# Patient Record
Sex: FEMALE | Race: WHITE | NOT HISPANIC OR LATINO | Employment: UNEMPLOYED | URBAN - NONMETROPOLITAN AREA
[De-identification: names, ages, dates, MRNs, and addresses within clinical notes are randomized per-mention and may not be internally consistent; named-entity substitution may affect disease eponyms.]

---

## 2020-04-04 ENCOUNTER — OFFICE VISIT CONVERTED (OUTPATIENT)
Dept: FAMILY MEDICINE CLINIC | Age: 25
End: 2020-04-04
Attending: NURSE PRACTITIONER

## 2021-05-18 NOTE — PROGRESS NOTES
Amie Haddad  1995     Office/Outpatient Visit    Visit Date: Sat, Apr 4, 2020 11:01 am    Provider: Genevieve Carlson N.P. (Assistant: Therese Ervin LPN)    Location: [facility]        Electronically signed by Genevieve Carlson N.P. on  04/04/2020 11:52:38 AM                             Subjective:        CC: Ms. Haddad is a 24 year old White female.  This is her first visit to the clinic.  She presents with cold symptoms.          HPI: 24 year old presenting for TELEHEALTH visit c/o sore throat, subjective fever, weak, luly ear pain x 3 days. She has tried mucinex and otc allergy medication. Someone at work, [employer] tested negative for strep, flu and mono. She was tested for corona virus yesterday.    ROS:     CONSTITUTIONAL:  Positive for fatigue and fever.   Negative for chills.      EYES:  Negative for blurred vision and eye drainage.      E/N/T:  Positive for ear pain ( bilateral ), nasal congestion, frequent rhinorrhea and sore throat.   Negative for diminished hearing or sinus pressure.      CARDIOVASCULAR:  Negative for chest pain, palpitations and pedal edema.      RESPIRATORY:  Negative for recent cough and dyspnea.      GASTROINTESTINAL:  Negative for abdominal pain, diarrhea, nausea and vomiting.      MUSCULOSKELETAL:  Negative for myalgias.      INTEGUMENTARY/BREAST:  Negative for rash.      NEUROLOGICAL:  Negative for dizziness, paresthesias and weakness.          Past Medical History / Family History / Social History:         Last Reviewed on 4/04/2020 11:44 AM by Genevieve Carlson    Past Medical History:         benign heart murmur dg at 2 or 3         GYNECOLOGICAL HISTORY:    No problems with menstrual cycles.    Menarche occurred at age 14. SEXUALLY ACTIVE YES         Surgical History:     Other Surgeries    wisdom teeth removed;         Family History:     Father: Healthy     Mother: Hypertension;  gest. diabetes     Sister(s): Healthy; 2 sister(s) total      Paternal Grandfather: Hypertension;  Dementia     Paternal Grandmother: Hypertension;  Cerebrovascular Accident;  Type 2 Diabetes     Maternal Grandfather:  at age 64; Cause of death was MI     Maternal Grandmother:  at age 65; Cause of death was CHF;  Type 2 Diabetes         Social History:     Occupation: [employer]     Marital Status: Single     Children: 1 child         Tobacco/Alcohol/Supplements:     Last Reviewed on 2020 11:44 AM by Genevieve Carlson    Tobacco: She has never smoked.  Non-drinker         Substance Abuse History:     Last Reviewed on 2020 11:44 AM by Genevieve Carlson    None         Mental Health History:     Last Reviewed on 2020 11:44 AM by Genevieve Carlson    NEGATIVE         Immunizations:     None        Allergies:     Last Reviewed on 2020 11:44 AM by Genevieve Carlson    No Known Allergies.        Current Medications:     Last Reviewed on 2020 11:44 AM by Genevieve Carlson    etonogestreL 68 mg Subdermal Implant [in L arm]    diphenhydrAMINE HCl 25 mg oral capsule [take 1 capsule (25 mg) by oral route daily]        Assessment:         J06.9   Acute upper respiratory infection, unspecified           ORDERS:         Meds Prescribed:       [New Rx] Augmentin 875-125 mg oral tablet [take 1 tablet by oral route every 12 hours], #20 (twenty) tablets, Refills: 0 (zero)       [New Rx] fluticasone propionate 50 mcg/actuation Intranasal Spray, Suspension [inhale 1 spray (50 mcg) in each nostril by intranasal route daily], #16 (sixteen) milliliters, Refills: 0 (zero)                 Plan:         Acute upper respiratory infection, unspecifiedIncrease fluid intake and rest. Complete rx abx. Continue to take daily allergy medication. If coworker's test is positive, stay home for minimum of 1 week and then 72 hours symptoms free. If negative, stay home until 72 hrs symptom free. Pt is going to find number to fax note too and call back to office. Pt v/u and had no  further questions.     Telehealth: Verbal consent obtained for visit to occur via phone call; ; Total time spent was 8 minutes; [id number]--Yynvvovsl E/M 5-10 minutes           Prescriptions:       [New Rx] Augmentin 875-125 mg oral tablet [take 1 tablet by oral route every 12 hours], #20 (twenty) tablets, Refills: 0 (zero)       [New Rx] fluticasone propionate 50 mcg/actuation Intranasal Spray, Suspension [inhale 1 spray (50 mcg) in each nostril by intranasal route daily], #16 (sixteen) milliliters, Refills: 0 (zero)             Charge Capture:         Primary Diagnosis:     J06.9  Acute upper respiratory infection, unspecified           Orders:      63114  Phys/QHP telephone evaluation 5-10 min  (In-House)

## 2022-10-24 ENCOUNTER — OFFICE VISIT (OUTPATIENT)
Dept: FAMILY MEDICINE CLINIC | Age: 27
End: 2022-10-24

## 2022-10-24 VITALS
DIASTOLIC BLOOD PRESSURE: 71 MMHG | HEIGHT: 63 IN | TEMPERATURE: 98.9 F | BODY MASS INDEX: 31.4 KG/M2 | SYSTOLIC BLOOD PRESSURE: 114 MMHG | HEART RATE: 99 BPM | WEIGHT: 177.2 LBS

## 2022-10-24 DIAGNOSIS — B07.0 PLANTAR WART OF RIGHT FOOT: Primary | ICD-10-CM

## 2022-10-24 PROCEDURE — 99213 OFFICE O/P EST LOW 20 MIN: CPT | Performed by: NURSE PRACTITIONER

## 2022-10-24 NOTE — PROGRESS NOTES
"Chief Complaint  Toe Pain (\"Corn\" on heels, very painful. Wanting a referral to the podiatrist. )    Subjective          Amie Haddad presents to [facility] for painful corns on her right heel.  She is wanting a referral to a podiatrist.  She has been using the corn patches that she can buy over-the-counter.  No other acute concerns or issues at this time.      Objective   Vital Signs:   Vitals:    10/24/22 1045   BP: 114/71   BP Location: Left arm   Patient Position: Sitting   Cuff Size: Adult   Pulse: 99   Temp: 98.9 °F (37.2 °C)   TempSrc: Oral   Weight: 80.4 kg (177 lb 3.2 oz)   Height: 160 cm (63\")       Physical Exam  Vitals reviewed.   Constitutional:       General: She is not in acute distress.     Appearance: Normal appearance. She is well-developed.   HENT:      Head: Normocephalic and atraumatic.   Eyes:      Conjunctiva/sclera: Conjunctivae normal.      Pupils: Pupils are equal, round, and reactive to light.   Pulmonary:      Effort: Pulmonary effort is normal. No respiratory distress.   Skin:     General: Skin is warm and dry.      Comments: 3 plantar warts to right heel   Neurological:      Mental Status: She is alert and oriented to person, place, and time.   Psychiatric:         Mood and Affect: Mood and affect normal.         Behavior: Behavior normal.         Thought Content: Thought content normal.         Judgment: Judgment normal.          Result Review :              Assessment and Plan    Diagnoses and all orders for this visit:    1. Plantar wart of right foot (Primary)  -     Ambulatory Referral to Podiatry    Other orders  -     Salicylic Acid 28 % solution; Apply 2 application topically Daily. Soak wart in warm water for 5 minutes. Dry area thoroughly. Apply to entire affected surface, allow to dry, and then apply a second time.  Dispense: 10 g; Refill: 0    Patient to notify office with any acute concerns or issues.  Patient verbalizes understanding, " agrees with plan of care and has no further questions upon discharge.    Please note that portions of this note were completed with a voice recognition program.    Follow Up    Return if symptoms worsen or fail to improve.  Patient was given instructions and counseling regarding her condition or for health maintenance advice. Please see specific information pulled into the AVS if appropriate.